# Patient Record
Sex: FEMALE | Race: WHITE
[De-identification: names, ages, dates, MRNs, and addresses within clinical notes are randomized per-mention and may not be internally consistent; named-entity substitution may affect disease eponyms.]

---

## 2019-02-02 ENCOUNTER — HOSPITAL ENCOUNTER (EMERGENCY)
Dept: HOSPITAL 56 - MW.ED | Age: 24
Discharge: HOME | End: 2019-02-02
Payer: COMMERCIAL

## 2019-02-02 DIAGNOSIS — Z79.899: ICD-10-CM

## 2019-02-02 NOTE — EDM.PDOC
ED HPI GENERAL MEDICAL PROBLEM





- General


Chief Complaint: Skin Complaint


Stated Complaint: INFECTION IN C SECTION


Time Seen by Provider: 19 17:33


Source of Information: Reports: Patient


History Limitations: Reports: No Limitations





- History of Present Illness


INITIAL COMMENTS - FREE TEXT/NARRATIVE: 


HISTORY AND PHYSICAL:





History of present illness:


Patient is a 23-year-old female who presents to the emergency room today with 

complaints of drainage from her  incision site. She had a  on 

18 by Dr. bah. She has had no complications post surgery. Today while 

getting out of the shower she felt some drainage noted to the right distal 

aspect of the incision site. She squeezed around the area and was able to 

express some green drainage from the corner. She does have some mild erythema 

and discomfort just around the incision border. She denies any fever, chills, 

chest pain, shortness of breath or cough. She denies any abdominal pain, nausea

, vomiting, diarrhea or constipation. She has been eating and drinking 

appropriately.


Patient is breast-feeding.


 3, para 3: all 3 are previous C-sections without difficulties





Review of systems: 


As per history of present illness and below otherwise all systems reviewed and 

negative.





Past medical history: 


As per history of present illness and as reviewed below otherwise 

noncontributory.





Surgical history: 


As per history of present illness and as reviewed below otherwise 

noncontributory.





Social history: 


See social history for further information





Family history: 


As per history of present illness and as reviewed below otherwise 

noncontributory.





Physical exam:


General: Well-developed and well-nourished 23-year-old female. Alert and 

oriented. Nontoxic appearing and in no acute distress.


HEENT: Atraumatic, normocephalic, pupils equal and reactive bilaterally, 

negative for conjunctival pallor or scleral icterus, mucous membranes moist, 

TMs normal bilaterally, throat clear, neck supple, nontender, trachea midline. 

No drooling or trismus noted. No meningeal signs. No hot potato voice noted. 


Lungs: Clear to auscultation, breath sounds equal bilaterally, chest nontender.


Heart: S1S2, regular rate and rhythm without overt murmur


Abdomen: Soft, nondistended, nontender. Negative for masses or 

hepatosplenomegaly. Negative for costovertebral tenderness.


Pelvis: Stable nontender.


Genitourinary: Deferred.


Rectal: Deferred.


Skin: Patient has a Pfannenstiel incisional that appears intact and healing 

well. There is approximately a 0.5 cm area of redness with serosanguineous 

green fluid. Unable to appreciate any form of abscess or mass underneath the 

incision site. Otherwise skin is intact, warm, dry. No lesions or rashes noted.


Extremities: Atraumatic, moves all per self, negative for cords or calf pain. 

Neurovascular unremarkable.


Neuro: Awake, alert, oriented. Cranial nerves II through XII unremarkable. 

Cerebellum unremarkable. Motor and sensory unremarkable throughout. Exam 

nonfocal.





Diagnostics:


None





Therapeutics:


None





Prescription:


Keflex





Impression: 


Incisional infection





Plan:


1. Keep the area clean and dry. Wash gently twice daily with mild soap and 

water.


2. Take the antibiotic as prescribed. This medication is safe in breast-feeding 

although may cause some loose stools for baby.


3. Continue to monitor for signs of improvement. Tylenol and or ibuprofen as 

needed for pain management.


4. Please call your OB/GYN on Monday to inform them you were seen in the 

emergency room and placed on antibiotics. I would like you to follow-up with 

them next week. Return to the ED as needed and as discussed.





Definitive disposition and diagnosis as appropriate pending reevaluation and 

review of above.





  ** Right abdominal


Pain Score (Numeric/FACES): 4





- Related Data


 Allergies











Allergy/AdvReac Type Severity Reaction Status Date / Time


 


No Known Allergies Allergy   Verified 19 17:24











Home Meds: 


 Home Meds





PNV95/Ferrous Fumarate/FA [Prenatal Tablet] 1 tab PO DAILY 18 [History]


Escitalopram [Lexapro] 10 mg PO DAILY 19 [History]


cephALEXin [Keflex] 500 mg PO TID 7 Days #21 cap 19 [Rx]











Past Medical History





- Past Health History


Medical/Surgical History: Denies Medical/Surgical History


HEENT History: Reports: Other (See Below)


Other HEENT History: wears glasses/contacts


Gastrointestinal History: Reports: Other (See Below)


Other Gastrointestinal History: heartburn during pregnancy


Genitourinary History: Reports: None


OB/GYN History: Reports: Pregnancy


Endocrine/Metabolic History: Reports: Obesity/BMI 30+


Other Hematologic History: History DVT





- Infectious Disease History


Infectious Disease History: Reports: None





- Past Surgical History


Head Surgeries/Procedures: Reports: None


Female  Surgical History: Reports:  Section





Social & Family History





- Family History


Family Medical History: Noncontributory





- Tobacco Use


Smoking Status *Q: Never Smoker





- Caffeine Use


Caffeine Use: Reports: Coffee





- Recreational Drug Use


Recreational Drug Use: No





ED ROS GENERAL





- Review of Systems


Review Of Systems: ROS reveals no pertinent complaints other than HPI.





ED EXAM, SKIN/RASH


Exam: See Below (See dictation)





Course





- Vital Signs


Last Recorded V/S: 


 Last Vital Signs











Temp  96.1 F   19 17:25


 


Pulse  77   19 17:25


 


Resp  16   19 17:25


 


BP  110/52 L  19 17:25


 


Pulse Ox  96   19 17:25














Departure





- Departure


Time of Disposition: 17:42


Disposition: Home, Self-Care 01


Clinical Impression: 


 Incisional infection








- Discharge Information


Prescriptions: 


cephALEXin [Keflex] 500 mg PO TID 7 Days #21 cap


Instructions:  Cellulitis, Adult, Wound Dehiscence, Easy-to-Read


Referrals: 


PCP,Unknown [Primary Care Provider] - 


Forms:  ED Department Discharge


Additional Instructions: 


The following information is given to patients seen in the emergency department 

who are being discharged to home. This information is to outline your options 

for follow-up care. We provide all patients seen in our emergency department 

with a follow-up referral.





The need for follow-up, as well as the timing and circumstances, are variable 

depending upon the specifics of your emergency department visit.





If you don't have a primary care physician on staff, we will provide you with a 

referral. We always advise you to contact your personal physician following an 

emergency department visit to inform them of the circumstance of the visit and 

for follow-up with them and/or the need for any referrals to a consulting 

specialist.





The emergency department will also refer you to a specialist when appropriate. 

This referral assures that you have the opportunity for follow-up care with a 

specialist. All of these measure are taken in an effort to provide you with 

optimal care, which includes your follow-up.





Under all circumstances we always encourage you to contact your private 

physician who remains a resource for coordinating your care. When calling for 

follow-up care, please make the office aware that this follow-up is from your 

recent emergency room visit. If for any reason you are refused follow-up, 

please contact the [facility] Emergency 

Department at [phone] and asked to speak to the emergency department 

charge nurse.





[facility]


Primary Care


[address] [place] [address]


Phone: [phone]


Fax: [phone]





[facility]


[address] [place] [id number]


Phone: [phone]


Fax: [phone]





[facility]


[address] [place] [address]


Phone: [phone]


Fax: [phone]





1. Keep the area clean and dry. Wash gently twice daily with mild soap and 

water.


2. Take the antibiotic as prescribed. This medication is safe in breast-feeding 

although may cause some loose stools for baby.


3. Continue to monitor for signs of improvement. Tylenol and or ibuprofen as 

needed for pain management.


4. Please call your OB/GYN on Monday to inform them you were seen in the 

emergency room and placed on antibiotics. I would like you to follow-up with 

them next week. Return to the ED as needed and as discussed.